# Patient Record
Sex: FEMALE
[De-identification: names, ages, dates, MRNs, and addresses within clinical notes are randomized per-mention and may not be internally consistent; named-entity substitution may affect disease eponyms.]

---

## 2020-09-09 ENCOUNTER — HOSPITAL ENCOUNTER (EMERGENCY)
Dept: HOSPITAL 88 - ER | Age: 85
Discharge: HOME | End: 2020-09-09
Payer: MEDICARE

## 2020-09-09 VITALS — BODY MASS INDEX: 16.56 KG/M2 | WEIGHT: 90 LBS | HEIGHT: 62 IN

## 2020-09-09 DIAGNOSIS — G30.9: ICD-10-CM

## 2020-09-09 DIAGNOSIS — G40.909: Primary | ICD-10-CM

## 2020-09-09 DIAGNOSIS — J44.9: ICD-10-CM

## 2020-09-09 DIAGNOSIS — F02.80: ICD-10-CM

## 2020-09-09 DIAGNOSIS — G47.00: ICD-10-CM

## 2020-09-09 LAB
ALBUMIN SERPL-MCNC: 3.8 G/DL (ref 3.5–5)
ALBUMIN/GLOB SERPL: 1.2 {RATIO} (ref 0.8–2)
ALP SERPL-CCNC: 60 IU/L (ref 40–150)
ALT SERPL-CCNC: 8 IU/L (ref 0–55)
ANION GAP SERPL CALC-SCNC: 18.9 MMOL/L (ref 8–16)
BACTERIA URNS QL MICRO: (no result) /HPF
BASOPHILS # BLD AUTO: 0.1 10*3/UL (ref 0–0.1)
BASOPHILS NFR BLD AUTO: 1.4 % (ref 0–1)
BILIRUB UR QL: (no result)
BUN SERPL-MCNC: 11 MG/DL (ref 7–26)
BUN/CREAT SERPL: 18 (ref 6–25)
CALCIUM SERPL-MCNC: 8.7 MG/DL (ref 8.4–10.2)
CHLORIDE SERPL-SCNC: 101 MMOL/L (ref 98–107)
CLARITY UR: (no result)
CO2 SERPL-SCNC: 23 MMOL/L (ref 22–29)
COLOR UR: YELLOW
DEPRECATED NEUTROPHILS # BLD AUTO: 1.9 10*3/UL (ref 2.1–6.9)
DEPRECATED RBC URNS MANUAL-ACNC: (no result) /HPF (ref 0–5)
EGFRCR SERPLBLD CKD-EPI 2021: > 60 ML/MIN (ref 60–?)
EOSINOPHIL # BLD AUTO: 0.1 10*3/UL (ref 0–0.4)
EOSINOPHIL NFR BLD AUTO: 3.9 % (ref 0–6)
EPI CELLS URNS QL MICRO: (no result) /LPF
ERYTHROCYTE [DISTWIDTH] IN CORD BLOOD: 16.3 % (ref 11.7–14.4)
GLOBULIN PLAS-MCNC: 3.1 G/DL (ref 2.3–3.5)
GLUCOSE SERPLBLD-MCNC: 76 MG/DL (ref 74–118)
HCT VFR BLD AUTO: 33.7 % (ref 34.2–44.1)
HGB BLD-MCNC: 11.2 G/DL (ref 12–16)
KETONES UR QL STRIP.AUTO: (no result)
LEUKOCYTE ESTERASE UR QL STRIP.AUTO: NEGATIVE
LYMPHOCYTES # BLD: 1.1 10*3/UL (ref 1–3.2)
LYMPHOCYTES NFR BLD AUTO: 31.2 % (ref 18–39.1)
MCH RBC QN AUTO: 29.3 PG (ref 28–32)
MCHC RBC AUTO-ENTMCNC: 33.2 G/DL (ref 31–35)
MCV RBC AUTO: 88.2 FL (ref 81–99)
MONOCYTES # BLD AUTO: 0.4 10*3/UL (ref 0.2–0.8)
MONOCYTES NFR BLD AUTO: 11 % (ref 4.4–11.3)
NEUTS SEG NFR BLD AUTO: 51.7 % (ref 38.7–80)
NITRITE UR QL STRIP.AUTO: NEGATIVE
PLATELET # BLD AUTO: 154 X10E3/UL (ref 140–360)
POTASSIUM SERPL-SCNC: 3.9 MMOL/L (ref 3.5–5.1)
PROT UR QL STRIP.AUTO: NEGATIVE
RBC # BLD AUTO: 3.82 X10E6/UL (ref 3.6–5.1)
SODIUM SERPL-SCNC: 139 MMOL/L (ref 136–145)
SP GR UR STRIP: 1.02 (ref 1.01–1.02)
UROBILINOGEN UR STRIP-MCNC: 2 MG/DL (ref 0.2–1)
WBC #/AREA URNS HPF: (no result) /HPF (ref 0–5)

## 2020-09-09 PROCEDURE — 84484 ASSAY OF TROPONIN QUANT: CPT

## 2020-09-09 PROCEDURE — 81001 URINALYSIS AUTO W/SCOPE: CPT

## 2020-09-09 PROCEDURE — 93005 ELECTROCARDIOGRAM TRACING: CPT

## 2020-09-09 PROCEDURE — 80053 COMPREHEN METABOLIC PANEL: CPT

## 2020-09-09 PROCEDURE — 85025 COMPLETE CBC W/AUTO DIFF WBC: CPT

## 2020-09-09 PROCEDURE — 36415 COLL VENOUS BLD VENIPUNCTURE: CPT

## 2020-09-09 PROCEDURE — 99284 EMERGENCY DEPT VISIT MOD MDM: CPT

## 2020-09-09 PROCEDURE — 71045 X-RAY EXAM CHEST 1 VIEW: CPT

## 2020-09-09 PROCEDURE — 70450 CT HEAD/BRAIN W/O DYE: CPT

## 2020-09-09 NOTE — EMERGENCY DEPARTMENT NOTE
History of Present Illnes


History of Present Illness


Chief Complaint:  Seizure


History of Present Illness


This is a 86 year old  female Chief Complaint Comment PATIENT SENT FROM [facility]. PATIENT BASELINE ALERT X 1 TO NAME. WHEELCHAIR BOUND PER FAMILY SHE 

WAS HAVING SEIZURE LIKE ACTIVITY LAST NIGHT. Patient has no complaints. She has 

seizures at baseline. NH states they did not witness a seizure


Historian:  Paramedic/EMS


Arrival Mode:  CHAITANYA


 Required:  No


Onset (how long ago):  unknown


Location:  Generalized


Quality:  Seizure


Radiation:  Reports non-radiation


Severity:  mild


Onset quality:  sudden


Timing of current episode:  sporadic


Progression:  resolved


Chronicity:  recurrent


Context:  Denies recent illness, Denies recent surgery


Relieving factors:  none


Exacerbating factors:  none


Associated symptoms:  Reports denies other symptoms


Treatments prior to arrival:  none





Past Medical/Family History


Physician Review


I have reviewed the patient's past medical and family history.  Any updates have

been documented here.





Past Medical History


Recent Fever:  No


Clinical Suspicion of Infectio:  No


New/Unexplained Change in Ment:  No


Past Medical History:  COPD, Seizure Disorder, UTI's


Other Medical History:  


DEMENTIA/ALZHEIMER





INSOMNIA





Review of Systems


Review of Systems


Constitutional:  Reports as per HPI


EENTM:  Reports no symptoms


Cardiovascular:  Reports no symptoms


Respiratory:  Reports no symptoms


Gastrointestinal:  Reports no symptoms


Genitourinary:  Reports no symptoms


Musculoskeletal:  Reports no symptoms


Integumentary:  Reports no symptoms


Neurological:  Reports as per HPI, Reports seizure


Psychological:  Reports no symptoms


Endocrine:  Reports no symptoms


Hematological/Lymphatic:  Reports no symptoms





Physical Exam


Related Data


Allergies:  


Coded Allergies:  


     codeine (Verified  Allergy, Unknown, 9/9/20)


     diphenhydramine (Verified  Allergy, Unknown, 9/9/20)


     promethazine (Verified  Allergy, Unknown, 9/9/20)


Uncoded Allergies:  


     KEPRA (Allergy, Unknown, 9/9/20)


Triage Vital Signs





Vital Signs








  Date Time  Temp Pulse Resp B/P (MAP) Pulse Ox O2 Delivery O2 Flow Rate FiO2


 


9/9/20 13:19  72 18 128/74 100 Room Air  








Vital signs reviewed:  Yes





Physical Exam


CONSTITUTIONAL





Constitutional:  Present well-developed, Present well-nourished


HENT


HENT:  Present normocephalic, Present atraumatic, Present oropharynx 

clear/moist, Present nose normal


HENT L/R:  Present left ext ear normal, Present right ext ear normal


EYES





Eyes:  Reports PERRL, Reports conjunctivae normal


NECK


Neck:  Present ROM normal


PULMONARY


Pulmonary:  Present effort normal, Present breath sounds normal


CARDIOVASCULAR





Cardiovascular:  Present regular rhythm, Present heart sounds normal, Present 

capillary refill normal, Present normal rate


GASTROINTESTINAL





Abdominal:  Present soft, Present nontender, Present bowel sounds normal


GENITOURINARY





Genitourinary:  Present exam deferred


SKIN


Skin:  Present warm, Present dry


MUSCULOSKELETAL





Musculoskeletal:  Present ROM normal


NEUROLOGICAL





Neurological:  Present alert, Present no gross motor or sensory deficits; 


   Absent oriented x 3 (A&Ox1 baseline)


PSYCHOLOGICAL


Psychological:  Present mood/affect normal, Present judgement normal





Results


Laboratory


Lab results reviewed:  Yes





Imaging


Imaging results reviewed:  Yes





Diagnostics Tests


Diagnostic test(s) reviewed:  Yes





Procedures


12 Lead ECG Interpretation


ECG Interpretation :  


   :  Interpreted by ED physician


   Date:  Sep 9, 2020


   Rhythm:  paced (Atrial)


   Rate:  normal


   QRS axis:  left


   ST segments normal:  Yes


   T waves normal:  Yes


   Clinical Impression:  non-specific ECG





Assessment & Plan


Medical Decision Making


MDM


86 y.o F with unwitnessed seizure. Family would like her checked out. Exam shows

baseline patient per family. No concerns per patient. CT head, labs, 

unremarkable. NPH on CT. No acute intervention at this time. She will f/u w/ her

neurologist and PCP. Doubt emergent process at this time. I discussed results 

patient as well as expected disease time course and management. They will follow

up with their primary care provider or return to the emergency department for 

new or worsening symptoms. Patient's appropriate for discharge.





Reassessment


Reassessment time:  17:29


Reassessment


Well appearing, NAD





Assessment & Plan


Final Impression:  


(1) Seizure disorder


Depart Disposition:  HOME, SELF-CARE


Last Vital Signs











  Date Time  Temp Pulse Resp B/P (MAP) Pulse Ox O2 Delivery O2 Flow Rate FiO2


 


9/9/20 13:19  72 18 128/74 100 Room Air  








Home Meds


Reported Medications


Losartan Potassium (LOSARTAN POTASSIUM) 25 Mg Tablet, 25 MG PO DAILY


   9/9/20


Cyanocobalamin (VITAMIN B-12) 1,000 Mcg Tab, 500 MCG PO DAILY, #30 TAB


   9/9/20


Mirtazapine (REMERON) 15 Mg Tablet, 15 MG PO HS, TAB


   9/9/20


Tetrahydrozoline Hcl (TETRAHYDROZOLINE HCL) 15 Ml Drops, 1 DROP OU QID


   9/9/20


Donepezil Hcl (ARICEPT) 5 Mg Tablet, 2.5 MG PO HS, #30 TAB


   9/9/20


Phenytoin Sodium Extended (DILANTIN) 100 Mg Capsule, 100 MG PO TID


   9/9/20


Carvedilol (COREG) 3.125 Mg Tab, 6.25 MG PO BID


   9/9/20


Fluticasone/Vilanterol (Breo Ellipta 100-25 Mcg INH) 1 Each Blst.w.dev, 1 INH 

INH DAILY


   9/9/20


Aspirin (ASPIRIN CHEW) 81 Mg Chew, 81 MG PO DAILY, #30 TAB


   9/9/20











DELVIS MAGALLON MD           Sep 9, 2020 13:52

## 2020-09-09 NOTE — DIAGNOSTIC IMAGING REPORT
Examination: CT head without contrast

Clinical Indication: Seizures.

Technique: Transaxial noncontrast images from the skull base through the vertex

were obtained. Sagittal and coronal reformatted images were done.

Dose modulation, iterative reconstruction, and/or weight based adjustment of

the mA/kV was utilized to reduce the radiation dose to as low as reasonably

achievable. 

Comparison: None.



Findings:



Scalp: No abnormalities.

Bones: Intact. No fractures.  No blastic or lytic lesions. 



Brain sulci: Appropriate for patient's age.

Ventricles:  The ventricular size is out of proportion with respect to cerebral

convexity sulci, concerning for a communicating type of hydrocephalus, such as

normal pressure hydrocephalus. 



Extra-axial space:

No abnormalities.



Parenchyma: 

There are confluent areas of low-attenuation within subcortical and

periventricular white matter, nonspecific, but could represent microvascular

ischemic disease.

No masses, hemorrhage, or acute or chronic cortical based vascular insults.



Suprasellar region: No abnormalities.

Craniocervical junction: The foramen magnum is patent.  No Chiari one

malformation.



Incidental findings: 

Atherosclerotic calcification of the cavernous and supraclinoid internal

carotid  and V4 segments of the bilateral vertebral arteries.



Impression:



1.  No acute intracranial finding.



2.  Chronic microvascular ischemic change.



3.  Findings as described above are concerning for normal pressure

hydrocephalus.



Signed by: Dr. Mandy Niño M.D. on 9/9/2020 2:44 PM

## 2020-09-09 NOTE — DIAGNOSTIC IMAGING REPORT
EXAM:  CHEST SINGLE (PORTABLE)



DATE: 9/9/2020 2:00 PM  



INDICATION: Seizure 



COMPARISON: None



FINDINGS:

Left-sided multilead AICD identified in place.



The trachea is midline. The lungs are symmetrically expanded without evidence

for large focal consolidation, pneumothorax, or significant pleural effusion.



The cardiomediastinal silhouette and pulmonary vasculature are within normal

limits. Atherosclerotic calcifications are noted within the thoracic aorta.

Degenerative changes noted of the visualized spine and shoulders. No acute

osseous abnormality is identified. The surrounding soft tissues are

unremarkable.





IMPRESSION:



No acute cardiopulmonary process identified.



Signed by: Dr. Jordan Husain MD on 9/9/2020 2:14 PM